# Patient Record
Sex: FEMALE
[De-identification: names, ages, dates, MRNs, and addresses within clinical notes are randomized per-mention and may not be internally consistent; named-entity substitution may affect disease eponyms.]

---

## 2017-01-17 ENCOUNTER — RECORDS - HEALTHEAST (OUTPATIENT)
Dept: ADMINISTRATIVE | Facility: OTHER | Age: 32
End: 2017-01-17

## 2017-07-08 ENCOUNTER — NURSE TRIAGE (OUTPATIENT)
Dept: NURSING | Facility: CLINIC | Age: 32
End: 2017-07-08

## 2017-07-08 ENCOUNTER — OFFICE VISIT (OUTPATIENT)
Dept: URGENT CARE | Facility: URGENT CARE | Age: 32
End: 2017-07-08
Payer: COMMERCIAL

## 2017-07-08 VITALS
DIASTOLIC BLOOD PRESSURE: 69 MMHG | OXYGEN SATURATION: 98 % | SYSTOLIC BLOOD PRESSURE: 110 MMHG | HEART RATE: 121 BPM | TEMPERATURE: 99.6 F

## 2017-07-08 DIAGNOSIS — N61.0 MASTITIS IN FEMALE: Primary | ICD-10-CM

## 2017-07-08 PROCEDURE — 99203 OFFICE O/P NEW LOW 30 MIN: CPT | Performed by: FAMILY MEDICINE

## 2017-07-08 RX ORDER — DICLOXACILLIN SODIUM 500 MG
500 CAPSULE ORAL 4 TIMES DAILY
Qty: 40 CAPSULE | Refills: 0 | Status: SHIPPED | OUTPATIENT
Start: 2017-07-08

## 2017-07-08 NOTE — NURSING NOTE
Chief Complaint   Patient presents with     Urgent Care     Infection       Initial /69 (BP Location: Right arm, Patient Position: Chair, Cuff Size: Adult Regular)  Pulse 121  Temp 99.6  F (37.6  C)  SpO2 98% There is no height or weight on file to calculate BMI.  Medication Reconciliation: complete     Gianna Willingham CMA (AAMA)

## 2017-07-08 NOTE — PROGRESS NOTES
SUBJECTIVE:                                                    Rashida Dalal is a 31 year old female who presents to clinic today for the following health issues:    Possible L breast infection from breast feeding x1 day, Fever of 101, swollen and red line, tender to touch. She has tried expressing Milk from the breast with little success on relieving the infection.  Having significant discomfort.            Problem list and histories reviewed & adjusted, as indicated.  Additional history:     There is no problem list on file for this patient.    No past surgical history on file.    Social History   Substance Use Topics     Smoking status: Not on file     Smokeless tobacco: Not on file     Alcohol use Not on file     No family history on file.        Reviewed and updated as needed this visit by clinical staff       Reviewed and updated as needed this visit by Provider         ROS:  Constitutional, HEENT, cardiovascular, pulmonary, gi and gu systems are negative, except as otherwise noted.    OBJECTIVE:     /69 (BP Location: Right arm, Patient Position: Chair, Cuff Size: Adult Regular)  Pulse 121  Temp 99.6  F (37.6  C)  SpO2 98%  There is no height or weight on file to calculate BMI.  GENERAL: healthy, alert and no distress  NECK: no adenopathy, no asymmetry, masses, or scars and thyroid normal to palpation  RESP: lungs clear to auscultation - no rales, rhonchi or wheezes  BREAST: Left breast lateral aspect erythematous tender to touch no obvious skin lesions. No masses or fluctuance palpated. No adenopathy in the axilla  CV: regular rate and rhythm, normal S1 S2, no S3 or S4, no murmur, click or rub, no peripheral edema and peripheral pulses strong  ABDOMEN: soft, nontender, no hepatosplenomegaly, no masses and bowel sounds normal  MS: no gross musculoskeletal defects noted, no edema    Diagnostic Test Results:  none     ASSESSMENT/PLAN:               ICD-10-CM    1. Mastitis in female N61.0  dicloxacillin (DYNAPEN) 500 MG capsule     acetaminophen-codeine (TYLENOL #3) 300-30 MG per tablet       32-year-old with mastitis. This appears to be localized to the left breast.  It is not localized to the axilla.    She Has not had fever greater than 103 she does however complain of severe breast pain.  I Do however feel that this can be treated as an outpatient. She has no axillary adenopathy and she is not yet febrile    Discussed palpation of the breast expression of milk. She will continue to breast-feed.    Return to your primary care within the next week.  Patient handout on mastitis was given And reviewed    Medications sent to the pharmacy    [facility] Provider  [facility]

## 2017-07-08 NOTE — PATIENT INSTRUCTIONS
1. Ice area 3 times per day  2.  Pump, breast feed or manual expression  3. Antibiotics for 10 days  4. Follow with your PCP  5. Tylenol #3 ;

## 2017-07-08 NOTE — MR AVS SNAPSHOT
"              After Visit Summary   2017    Rashida Dalal    MRN: [id number]           Patient Information     Date Of Birth          1985        Visit Information        Provider Department      2017 11:55 AM Provider, [facility]        Today's Diagnoses     Mastitis in female    -  1      Care Instructions    1. Ice area 3 times per day  2.  Pump, breast feed or manual expression  3. Antibiotics for 10 days  4. Follow with your PCP  5. Tylenol #3 ;           Follow-ups after your visit        Who to contact     If you have questions or need follow up information about today's clinic visit or your schedule please contact [facility] directly at [phone].  Normal or non-critical lab and imaging results will be communicated to you by [employer], letter or phone within 4 business days after the clinic has received the results. If you do not hear from us within 7 days, please contact the clinic through [employer] or phone. If you have a critical or abnormal lab result, we will notify you by phone as soon as possible.  Submit refill requests through [employer] or call your pharmacy and they will forward the refill request to us. Please allow 3 business days for your refill to be completed.          Additional Information About Your Visit        MyChart Information     [employer] lets you send messages to your doctor, view your test results, renew your prescriptions, schedule appointments and more. To sign up, go to [email or web address] . Click on \"Log in\" on the left side of the screen, which will take you to the Welcome page. Then click on \"Sign up Now\" on the right side of the page.     You will be asked to enter the access code listed below, as well as some personal information. Please follow the directions to create your username and password.     Your access code is: [email or web address]  Expires: 10/6/2017 12:42 PM     Your access code will  in 90 days. If you " need help or a new code, please call your [facility] clinic or [phone].        Care EveryWhere ID     This is your Care EveryWhere ID. This could be used by other organizations to access your [facility] medical records  [id number]        Your Vitals Were     Pulse Temperature Pulse Oximetry             121 99.6  F (37.6  C) 98%          Blood Pressure from Last 3 Encounters:   07/08/17 110/69    Weight from Last 3 Encounters:   No data found for Wt              Today, you had the following     No orders found for display         Today's Medication Changes          These changes are accurate as of: 7/8/17 12:44 PM.  If you have any questions, ask your nurse or doctor.               Start taking these medicines.        Dose/Directions    acetaminophen-codeine 300-30 MG per tablet   Commonly known as:  TYLENOL #3   Used for:  Mastitis in female        Dose:  1 tablet   Take 1 tablet by mouth every 6 hours as needed for pain maximum 3 tablet(s) per day   Quantity:  15 tablet   Refills:  0       dicloxacillin 500 MG capsule   Commonly known as:  DYNAPEN   Used for:  Mastitis in female        Dose:  500 mg   Take 1 capsule (500 mg) by mouth 4 times daily   Quantity:  40 capsule   Refills:  0            Where to get your medicines      These medications were sent to [address] [address]    Hours:  Tech issues with their phone system Phone:  [phone]     dicloxacillin 500 MG capsule         Some of these will need a paper prescription and others can be bought over the counter.  Ask your nurse if you have questions.     Bring a paper prescription for each of these medications     acetaminophen-codeine 300-30 MG per tablet                Primary Care Provider    None Specified       No primary provider on file.        Equal Access to Services     CATHIE HANCOCK : Ara Whittaker, mary kate randle, malachi perez  fanny bondsmichele prestonbrittnee boston'aan ah. So [facility] [phone].    ATENCIÓN: Si habla olimpia, tiene a bello disposición servicios gratuitos de asistencia lingüística. Magui al [phone].    We comply with applicable federal civil rights laws and [place] laws. We do not discriminate on the basis of race, color, national origin, age, disability sex, sexual orientation or gender identity.            Thank you!     Thank you for choosing [facility]  for your care. Our goal is always to provide you with excellent care. Hearing back from our patients is one way we can continue to improve our services. Please take a few minutes to complete the written survey that you may receive in the mail after your visit with us. Thank you!             Your Updated Medication List - Protect others around you: Learn how to safely use, store and throw away your medicines at www.disposemymeds.org.          This list is accurate as of: 7/8/17 12:44 PM.  Always use your most recent med list.                   Brand Name Dispense Instructions for use Diagnosis    acetaminophen-codeine 300-30 MG per tablet    TYLENOL #3    15 tablet    Take 1 tablet by mouth every 6 hours as needed for pain maximum 3 tablet(s) per day    Mastitis in female       dicloxacillin 500 MG capsule    DYNAPEN    40 capsule    Take 1 capsule (500 mg) by mouth 4 times daily    Mastitis in female       DOMPERIDONE 10 MG TAB/CAP           LEVOTHROID PO

## 2017-07-08 NOTE — TELEPHONE ENCOUNTER
"Mom calling reporting she is breastfeeding a 9 month old. Stating she is experiencing pain in breast with new redness. \"More of a line\" \"2inches.\"   Current temp is unknown. Patient reporting feeling chilled. Continues to breastfeed/and pump.    Reason for Disposition    [1] Breast looks infected (spreading redness, feels hot to touch) AND [2] large red area (> 2 in. or 5 cm)    Additional Information    Negative: Chest pain    Patient is breastfeeding    Postpartum breast pain and swelling, not breastfeeding    Negative: Sounds like a life-threatening emergency to the triager    Negative: Patient plans to continue breastfeeding    Negative: [1] SEVERE breast pain AND [2] fever > 103 F (39.4 C)    Negative: Patient sounds very sick or weak to the triager    Protocols used: POSTPARTUM - BREAST PAIN AND ENGORGEMENT-ADULT-AH, BREAST SYMPTOMS-ADULT-AH    "